# Patient Record
Sex: MALE | Race: WHITE
[De-identification: names, ages, dates, MRNs, and addresses within clinical notes are randomized per-mention and may not be internally consistent; named-entity substitution may affect disease eponyms.]

---

## 2017-03-28 ENCOUNTER — HOSPITAL ENCOUNTER (OUTPATIENT)
Age: 62
Discharge: HOME | End: 2017-03-28
Payer: COMMERCIAL

## 2017-03-28 DIAGNOSIS — Z95.5: ICD-10-CM

## 2017-03-28 DIAGNOSIS — K40.90: Primary | ICD-10-CM

## 2017-03-28 DIAGNOSIS — G47.33: ICD-10-CM

## 2017-03-28 DIAGNOSIS — M35.3: ICD-10-CM

## 2017-03-28 DIAGNOSIS — I25.10: ICD-10-CM

## 2017-03-28 PROCEDURE — 49650 LAP ING HERNIA REPAIR INIT: CPT

## 2017-03-28 PROCEDURE — 0YU54JZ SUPPLEMENT RIGHT INGUINAL REGION WITH SYNTHETIC SUBSTITUTE, PERCUTANEOUS ENDOSCOPIC APPROACH: ICD-10-PCS | Performed by: SURGERY

## 2020-02-28 ENCOUNTER — HOSPITAL ENCOUNTER (EMERGENCY)
Dept: HOSPITAL 76 - ED | Age: 65
Discharge: HOME | End: 2020-02-28
Payer: COMMERCIAL

## 2020-02-28 VITALS — DIASTOLIC BLOOD PRESSURE: 72 MMHG | SYSTOLIC BLOOD PRESSURE: 129 MMHG

## 2020-02-28 DIAGNOSIS — Z79.82: ICD-10-CM

## 2020-02-28 DIAGNOSIS — M35.3: ICD-10-CM
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
## 2020-02-28 DIAGNOSIS — Z87.891: ICD-10-CM
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
## 2020-02-28 DIAGNOSIS — S80.12XA: Primary | ICD-10-CM

## 2020-02-28 PROCEDURE — 1040M: CPT

## 2020-02-28 PROCEDURE — 73610 X-RAY EXAM OF ANKLE: CPT

## 2020-02-28 PROCEDURE — 73590 X-RAY EXAM OF LOWER LEG: CPT

## 2020-02-28 PROCEDURE — 99283 EMERGENCY DEPT VISIT LOW MDM: CPT

## 2020-02-28 NOTE — XRAY REPORT
Reason:  injury

Procedure Date:  02/28/2020   

Accession Number:  [id number] / [id number]                    

Procedure:  XR  - Tib/Fib LT CPT Code:  

 

***Final Report***

 

 

FULL RESULT:

 

 

EXAM:

LEFT TIBIA/FIBULA RADIOGRAPHY

 

EXAM DATE: 2/28/2020 12:00 PM.

 

CLINICAL HISTORY: Pain after injury.

 

COMPARISON: None.

 

TECHNIQUE: 2 views.

 

FINDINGS:

Bones: Normal. No fracture or bone lesion.

 

Joints: The visualized knee and ankle joints are normal. No effusions.

 

Soft Tissues: Normal. No soft tissue swelling.

IMPRESSION: Normal tibia/fibula radiography.

 

[facility]

## 2020-02-28 NOTE — ED PHYSICIAN DOCUMENTATION
PD HPI LOWER EXT INJURY





- Stated complaint


Stated Complaint: L LEG PX





- Chief complaint


Chief Complaint: Ext Problem





- History obtained from


History obtained from: Patient





- History of Present Illness


PD HPI LOW EXT INJURY LOCATION: Left (at work about a week ago a large box fell 

on the lateral side of his left leg and he has persistent pain there.  He can 

walk and bear weight.  Pain is actually worse at night and when he is inactive 

compared to when he is active.)





Review of Systems


Constitutional: reports: Reviewed and negative


Cardiac: reports: Reviewed and negative


Respiratory: reports: Reviewed and negative





PD PAST MEDICAL HISTORY





- Past Medical History


Cardiovascular: Coronary artery disease, Angina


Respiratory: Sleep apnea


Neuro: None


Endocrine/Autoimmune: None


GI: None


: None


HEENT: Chronic vision loss


Psych: None


Musculoskeletal: Rheumatoid arthritis, Chronic back pain


Derm: None


Other Past Medical History: polymyalgia rheumatica





- Past Surgical History


Past Surgical History: Yes


Ortho: Spine surgery


Cardiovascular: Coronary stent





- Present Medications


Home Medications: 


                                Ambulatory Orders











 Medication  Instructions  Recorded  Confirmed


 


Aspirin [Aspir-Low] 81 mg PO DAILY 03/22/17 03/28/17


 


Metoprolol Garcia/Hydrochlorothiaz 25 mg PO DAILY 03/22/17 03/28/17





[Metoprolol ER-Hctz 25-12.5 mg]   


 


Rosuvastatin Calcium [Crestor] 40 mg PO DAILY 03/22/17 03/28/17


 


predniSONE [Prednisone] 5 mg PO DAILY 03/22/17 03/28/17


 


Hydrocodone/Acetaminophen 1 - 2 each PO Q6H PRN #10 tablet 02/28/20 





[Hydrocodon-Acetaminophen 5-325]   














- Allergies


Allergies/Adverse Reactions: 


                                    Allergies











Allergy/AdvReac Type Severity Reaction Status Date / Time


 


morphine AdvReac  Nausea Verified 02/28/20 11:38














- Social History


Does the pt smoke?: No


Smoking Status: Former smoker


Does the pt drink ETOH?: Yes


Does the pt have substance abuse?: No





- Immunizations


Immunizations are current?: No


Immunizations: TDAP >10years/unknown





PD ED PE NORMAL





- Vitals


Vital signs reviewed: Yes





- General


General: Alert and oriented X 3, No acute distress





- Extremities


Extremities: Other (Some diffuse tenderness over the lateral side of the left 

leg without calf tenderness.  Mostly over the lateral ankle and the fibula above

that.  No foot tenderness.  No knee tenderness.  Normal neurovascular function 

in the foot.  No evidence of compartment syndrome.)





- Neuro


Neuro: Alert and oriented X 3, Normal speech





Results





- Vitals


Vitals: 


                               Vital Signs - 24 hr











  02/28/20





  11:35


 


Temperature 36.3 C L


 


Heart Rate 81


 


Respiratory 16





Rate 


 


Blood Pressure 129/72


 


O2 Saturation 99








                                     Oxygen











O2 Source                      Room air

















- Rads (name of study)


  ** XR L ankle and Tib Fib


Radiology: EMP read contemporaneously (normal)





Departure





- Departure


Disposition: 01 Home, Self Care


Clinical Impression: 


 Contusion of left leg





Condition: Good


Record reviewed to determine appropriate education?: Yes


Instructions:  ED Contusion Lower Ext


Prescriptions: 


Hydrocodone/Acetaminophen [Hydrocodon-Acetaminophen 5-325] 1 - 2 each PO Q6H PRN

#10 tablet


 PRN Reason: pain


Comments: 


Recheck with your doctor in 1 week if not better, return for new or worsening 

symptoms.


Discharge Date/Time: 02/28/20 13:32

## 2020-02-28 NOTE — XRAY REPORT
Reason:  injury

Procedure Date:  02/28/2020   

Accession Number:  [id number] / [id number]                    

Procedure:  XR  - Ankle 3 View LT CPT Code:  

 

***Final Report***

 

 

FULL RESULT:

 

 

EXAM:

LEFT ANKLE RADIOGRAPHY

 

EXAM DATE: 2/28/2020 12:00 PM.

 

CLINICAL HISTORY: Injury. 100 pound box fell on lower leg and ankle last 

week. Pain ever since.

 

COMPARISON: None.

 

TECHNIQUE: 3 views.

 

FINDINGS:

Bones: Small os trigonum is noted. No fractures or bone lesions.

 

Joints: Normal. No effusion. No subluxations. The ankle mortise is 

normally aligned.

 

Soft Tissues: Moderate soft tissue swelling over the lateral lower left 

leg.

IMPRESSION: No fracture or malalignment.

 

[facility]

## 2020-12-14 ENCOUNTER — HOSPITAL ENCOUNTER (EMERGENCY)
Dept: HOSPITAL 76 - ED | Age: 65
Discharge: HOME | End: 2020-12-14
Payer: MEDICARE

## 2020-12-14 VITALS — DIASTOLIC BLOOD PRESSURE: 60 MMHG | SYSTOLIC BLOOD PRESSURE: 102 MMHG

## 2020-12-14 DIAGNOSIS — R07.89: Primary | ICD-10-CM

## 2020-12-14 DIAGNOSIS — Z87.891: ICD-10-CM

## 2020-12-14 DIAGNOSIS — Z79.82: ICD-10-CM

## 2020-12-14 DIAGNOSIS — R10.13: ICD-10-CM

## 2020-12-14 DIAGNOSIS — R00.0: ICD-10-CM

## 2020-12-14 DIAGNOSIS — I25.10: ICD-10-CM

## 2020-12-14 DIAGNOSIS — Z95.5: ICD-10-CM

## 2020-12-14 DIAGNOSIS — I49.1: ICD-10-CM

## 2020-12-14 LAB
ALBUMIN DIAFP-MCNC: 4.7 G/DL (ref 3.2–5.5)
ALBUMIN/GLOB SERPL: 1.9 {RATIO} (ref 1–2.2)
ALP SERPL-CCNC: 61 IU/L (ref 42–121)
ALT SERPL W P-5'-P-CCNC: 39 IU/L (ref 10–60)
ANION GAP SERPL CALCULATED.4IONS-SCNC: 12 MMOL/L (ref 6–13)
AST SERPL W P-5'-P-CCNC: 26 IU/L (ref 10–42)
BASOPHILS NFR BLD AUTO: 0 10^3/UL (ref 0–0.1)
BASOPHILS NFR BLD AUTO: 0.3 %
BILIRUB BLD-MCNC: 1.3 MG/DL (ref 0.2–1)
BUN SERPL-MCNC: 18 MG/DL (ref 6–20)
CALCIUM UR-MCNC: 10.3 MG/DL (ref 8.5–10.3)
CHLORIDE SERPL-SCNC: 103 MMOL/L (ref 101–111)
CO2 SERPL-SCNC: 22 MMOL/L (ref 21–32)
CREAT SERPLBLD-SCNC: 1 MG/DL (ref 0.6–1.2)
EOSINOPHIL # BLD AUTO: 0.1 10^3/UL (ref 0–0.7)
EOSINOPHIL NFR BLD AUTO: 0.4 %
ERYTHROCYTE [DISTWIDTH] IN BLOOD BY AUTOMATED COUNT: 12.7 % (ref 12–15)
GLOBULIN SER-MCNC: 2.5 G/DL (ref 2.1–4.2)
GLUCOSE SERPL-MCNC: 127 MG/DL (ref 70–100)
HGB UR QL STRIP: 14.3 G/DL (ref 14–18)
LIPASE SERPL-CCNC: 37 U/L (ref 22–51)
LYMPHOCYTES # SPEC AUTO: 0.5 10^3/UL (ref 1.5–3.5)
LYMPHOCYTES NFR BLD AUTO: 4.7 %
MCH RBC QN AUTO: 33 PG (ref 27–31)
MCHC RBC AUTO-ENTMCNC: 34.1 G/DL (ref 32–36)
MCV RBC AUTO: 96.8 FL (ref 80–94)
MONOCYTES # BLD AUTO: 0.9 10^3/UL (ref 0–1)
MONOCYTES NFR BLD AUTO: 7.7 %
NEUTROPHILS # BLD AUTO: 10 10^3/UL (ref 1.5–6.6)
NEUTROPHILS # SNV AUTO: 11.5 X10^3/UL (ref 4.8–10.8)
NEUTROPHILS NFR BLD AUTO: 86.6 %
PDW BLD AUTO: 9.3 FL (ref 7.4–11.4)
PLATELET # BLD: 250 10^3/UL (ref 130–450)
PROT SPEC-MCNC: 7.2 G/DL (ref 6.7–8.2)
RBC MAR: 4.33 10^6/UL (ref 4.7–6.1)
SODIUM SERPLBLD-SCNC: 137 MMOL/L (ref 135–145)

## 2020-12-14 PROCEDURE — 84484 ASSAY OF TROPONIN QUANT: CPT

## 2020-12-14 PROCEDURE — 80053 COMPREHEN METABOLIC PANEL: CPT

## 2020-12-14 PROCEDURE — 96374 THER/PROPH/DIAG INJ IV PUSH: CPT

## 2020-12-14 PROCEDURE — 83690 ASSAY OF LIPASE: CPT

## 2020-12-14 PROCEDURE — 71275 CT ANGIOGRAPHY CHEST: CPT

## 2020-12-14 PROCEDURE — 99284 EMERGENCY DEPT VISIT MOD MDM: CPT

## 2020-12-14 PROCEDURE — 85025 COMPLETE CBC W/AUTO DIFF WBC: CPT

## 2020-12-14 PROCEDURE — 36415 COLL VENOUS BLD VENIPUNCTURE: CPT

## 2020-12-14 PROCEDURE — 71045 X-RAY EXAM CHEST 1 VIEW: CPT

## 2020-12-14 PROCEDURE — 93005 ELECTROCARDIOGRAM TRACING: CPT

## 2020-12-14 NOTE — CT REPORT
PROCEDURE:  ANGIO CHEST W/WO

 

INDICATIONS:  chest to back pain; evaluate aorta

 

CONTRAST:  IV CONTRAST: Optiray 320 ml: 80 PO CONTRAST: *NO PO CONTRAST 

 

TECHNIQUE:  

After the administration of intravenous contrast, 2 mm thick sections acquired from the pulmonary api
jaciel to the posterior costophrenic angles.  3-dimensional maximum intensity projection (MIP) coronal a
nd sagittal reformats were then acquired through the thorax. For radiation dose reduction, the follow
ing was used:  automated exposure control, adjustment of mA and/or kV according to patient size. 

 

COMPARISON:  Chest x-ray 12/14/2020

 

FINDINGS:  

Image quality:  Excellent.  

 

Pulmonary arteries:  Pulmonary arteries are normal in size, and demonstrate no intraluminal filling d
efects to suggest central pulmonary embolism.  

 

Lungs and pleura:  Lungs are clear.  No pleural effusions or pneumothorax.  Central and peripheral ai
rways are patent.  

 

Mediastinum:  Heart size is normal, without pericardial effusion.  No mediastinal or hilar adenopathy
.  Thoracic aorta is normal in caliber and enhancement.  There is no aneurysmal dilation, vascular oc
clusion, hemodynamically significant stenosis or dissection. Esophagus is normal in caliber, with mil
d hiatal hernia.  

 

Bones and chest wall:  No suspicious bony lesions.  Ribs and thoracic spine appear intact throughout.
  Is unremarkable  No axillary or supraclavicular adenopathy.  

 

Abdomen:  Visualized upper abdominal solid organs appear normal in the early arterial phase of enhanc
ement.  

 

IMPRESSION:  

 

 

1. No pulmonary embolism.

 

2. Aorta demonstrates no areas of hemodynamically significant stenosis, vascular occlusion, aneurysma
l dilation or dissection.

 

Reviewed by: Irma Segura MD on 12/14/2020 8:32 AM PST

Approved by: Irma Segura MD on 12/14/2020 8:32 AM PST

 

 

Station ID:  [id number]

## 2020-12-14 NOTE — ED PHYSICIAN DOCUMENTATION
ED Addendum





- Addendum


Addendum: 





12/14/20 07:35


Signout taken from Dr. Fernandez at shift change.  Briefly this is a 65-year-old 

gentleman with history of coronary disease, stented once in 2014 who developed 

upper abdominal pain rating to the back and shoulder blades last night after 

eating hot and sour soup.  At home he took some hydrocodone, and acids.  On my 

evaluation he is feeling better after a GI cocktail and some Toradol.  Initial 

troponin negative, EKG nonischemic.  Dr. Fernandez sent him for CT angiography of 

the chest to rule out dissection, waiting on radiology read but to my eye 

grossly negative.  Chest x-ray concerning for some bronchial wall thickening in 

the right lower lobe, I do not really appreciate this on the CAT scan, will 

await CT read before addressing this.  Will check a second troponin.  He is 

nontender in the right upper quadrant.


12/14/20 08:35


Repeat troponin completely flat, 10, 10.  CT angiography negative per the 

radiologist.


12/14/20 08:36


Disposition discharged home, condition stable


Diagnosis


1.  Unspecified chest pain

## 2020-12-14 NOTE — XRAY REPORT
PROCEDURE:  Chest 1 View X-Ray

 

INDICATIONS:  Chest Pain

 

TECHNIQUE:  One view of the chest was acquired.  

 

COMPARISON:  11/3/2014

 

FINDINGS:  

 

Surgical changes and devices:  None.  

 

Lungs and pleura:  No pleural effusions or pneumothorax.  Lungs are clear.  

 

Mediastinum:  Mediastinal contours appear normal.  Heart size is normal.  

 

Bones and chest wall:  No suspicious bony lesions.  Overlying soft tissues appear unremarkable.  

 

 

IMPRESSION:  

 

No acute cardiopulmonary disease process.

 

Reviewed by: Sophie Jose MD, PhD on 12/14/2020 9:31 AM [facility]

Approved by: Sophie Jose MD, PhD on 12/14/2020 9:31 AM [facility]

 

 

Station ID:  [id number]

## 2020-12-14 NOTE — ED PHYSICIAN DOCUMENTATION
PD HPI CHEST PAIN





- Stated complaint


Stated Complaint: CHEST PX





- History obtained from


History obtained from: Patient





- History of Present Illness


Timing - onset: How many hours ago (12), Yesterday (about 5 pm, soon after 

eating meal.)


Timing - onset during: Eating (soon after done eating dinner; was slightly 

spicy.)


Timing - duration: Hours (12)


Timing - details: Abrupt onset, Still present (epigastric pain worsens with 

lying down, better sitting up but then upper back hurts more. Has not eaten 

since onset. No change with antacids though.), Waxing and waning


Quality: Aching, Sharp, Pain


Location: Substernal


Radiation: Back, Left upper extremity (shoulders), Right upper extremity


Improved by: No: Antacids


Worsened by: Position (better sitting up).  No: Inspiration, Eating


Associated symptoms: Nausea.  No: Shortness of air, Diaphoresis, Feeling faint /

dizzy, General Weakness, Cough


Similar symptoms before: Has not had sx before (has had unstable angina 2014 

with stent placed. No MI. Did not feel like this, though.)


Recently seen: Not recently seen





Review of Systems


Constitutional: denies: Fever, Chills


Nose: denies: Rhinorrhea / runny nose, Congestion


Throat: denies: Sore throat


Cardiac: reports: Chest pain / pressure (just since last evening; no recent 

exertional CP nor dyspnea.).  denies: Palpitations, Pedal edema, Calf pain


Respiratory: denies: Cough


GI: denies: Abdominal Pain, Nausea, Vomiting, Diarrhea


Skin: denies: Rash, Lesions


Neurologic: denies: Generalized weakness, Near syncope





PD PAST MEDICAL HISTORY





- Past Medical History


Cardiovascular: Coronary artery disease, Angina


Respiratory: Sleep apnea


Neuro: None


Endocrine/Autoimmune: None


GI: None


: None


HEENT: Chronic vision loss


Psych: None


Musculoskeletal: Rheumatoid arthritis, Chronic back pain


Derm: None





- Past Surgical History


Past Surgical History: Yes


Ortho: Spine surgery


Cardiovascular: Coronary stent





- Present Medications


Home Medications: 


                                Ambulatory Orders











 Medication  Instructions  Recorded  Confirmed


 


Aspirin [Aspir-Low] 325 mg PO DAILY 03/22/17 03/28/17


 


Metoprolol Garcia/Hydrochlorothiaz 25 mg PO DAILY 03/22/17 03/28/17





[Metoprolol ER-Hctz 25-12.5 mg]   


 


Rosuvastatin Calcium [Crestor] 40 mg PO DAILY 03/22/17 03/28/17


 


predniSONE [Prednisone] 2.5 mg PO DAILY 03/22/17 03/28/17


 


Methotrexate  12/14/20 














- Allergies


Allergies/Adverse Reactions: 


                                    Allergies











Allergy/AdvReac Type Severity Reaction Status Date / Time


 


morphine AdvReac  Nausea Verified 12/14/20 05:24














- Social History


Does the pt smoke?: No


Smoking Status: Former smoker


Does the pt drink ETOH?: Yes


Does the pt have substance abuse?: No





- Immunizations


Immunizations are current?: No


Immunizations: TDAP >10years/unknown





PD ED PE NORMAL





- Vitals


Vital signs reviewed: Yes





- General


General: Alert and oriented X 3, No acute distress, Well developed/nourished





- HEENT


HEENT: Pharynx benign





- Neck


Neck: Supple, no meningeal sign, No adenopathy





- Cardiac


Cardiac: RRR, No murmur





- Respiratory


Respiratory: Clear bilaterally





- Abdomen


Abdomen: Normal bowel sounds, Soft, Non tender, Non distended





- Back


Back: No CVA TTP





- Derm


Derm: Normal color, Warm and dry





- Extremities


Extremities: No tenderness to palpate, Normal ROM s pain, No edema, No calf 

tenderness / cord





- Neuro


Neuro: Alert and oriented X 3, No motor deficit, Normal speech





Results





- Vitals


Vitals: 


                               Vital Signs - 24 hr











  12/14/20 12/14/20 12/14/20





  05:20 05:53 06:20


 


Temperature 36.8 C 36.8 C 


 


Heart Rate 111 H 94 99


 


Respiratory 20 16 16





Rate   


 


Blood Pressure 151/84 H 127/81 H 123/68


 


O2 Saturation 98 97 99














  12/14/20 12/14/20 12/14/20





  06:27 06:30 07:00


 


Temperature  36.8 C 


 


Heart Rate 108 H 94 99


 


Respiratory  16 14





Rate   


 


Blood Pressure 114/66 105/62 102/62


 


O2 Saturation  100 98








                                     Oxygen











O2 Source                      Room air

















- EKG (time done)


  ** 05:19


Rate: Rate (enter#) (102)


Rhythm: Sinus tachycardia


Axis: Normal


Intervals: Normal NC


QRS: Poor R wave progression


Ischemia: Normal ST segments.  No: ST elevation c/w ischemia, ST depression





- Labs


Labs: 


                                Laboratory Tests











  12/14/20 12/14/20 12/14/20





  05:31 05:31 05:31


 


WBC  11.5 H  


 


RBC  4.33 L  


 


Hgb  14.3  


 


Hct  41.9 L  


 


MCV  96.8 H  


 


MCH  33.0 H  


 


MCHC  34.1  


 


RDW  12.7  


 


Plt Count  250  


 


MPV  9.3  


 


Neut # (Auto)  10.0 H  


 


Lymph # (Auto)  0.5 L  


 


Mono # (Auto)  0.9  


 


Eos # (Auto)  0.1  


 


Baso # (Auto)  0.0  


 


Absolute Nucleated RBC  0.00  


 


Nucleated RBC %  0.0  


 


Sodium   137 


 


Potassium   3.8 


 


Chloride   103 


 


Carbon Dioxide   22 


 


Anion Gap   12.0 


 


BUN   18 


 


Creatinine   1.0 


 


Estimated GFR (MDRD)   75 L 


 


Glucose   127 H 


 


Calcium   10.3 


 


Total Bilirubin   1.3 H 


 


AST   26 


 


ALT   39 


 


Alkaline Phosphatase   61 


 


Troponin I High Sens    10.1


 


Total Protein   7.2 


 


Albumin   4.7 


 


Globulin   2.5 


 


Albumin/Globulin Ratio   1.9 


 


Lipase   37 














- Rads (name of study)


  ** chest xray


Radiology: Prelim report reviewed (possible small opacity right lower lobe; 

consideration bronchiolitis, pneumonia), See rad report





PD MEDICAL DECISION MAKING





- ED course


Complexity details: reviewed results, re-evaluated patient (no change with GI 

cocktail. No change with NTG. CXR clear. Labs without acute process. At this 

point, also consider vascular, such as dissection. Will get angio. ), considered

 differential (Your myocardial ischemia, lung process, gastritis or gallbladder,

 pancreatitis.  Will check EKG chest x-ray and labs.), d/w patient


ED course: 





The patient did not have any improvement with a GI cocktail nor nitroglycerin.  

Toradol is seeming to have some effect starting.  He had had a negative troponin

 at 12 hours after onset of symptoms which seems reasonably conclusive but we 

can still repeated at 2 hours to be quite sure.  Otherwise his description of 

pain is potentially concerning for vascular and given no other good explanation 

at this point, I talked with the patient and we shared decision making for angio

 of the chest to evaluate the aorta.  At this point that test is pending.  Care 

is given over to Dr. Horne who is the oncoming ER physician and will await the

 angio results and follow-up with the patient's clinical state and troponin 

repeat. 





Departure





- Departure


Clinical Impression: 


 Chest pain





Condition: Stable


Record reviewed to determine appropriate education?: Yes

## 2021-11-04 ENCOUNTER — HOSPITAL ENCOUNTER (OUTPATIENT)
Dept: HOSPITAL 76 - DI | Age: 66
Discharge: HOME | End: 2021-11-04
Attending: INTERNAL MEDICINE
Payer: OTHER GOVERNMENT

## 2021-11-04 DIAGNOSIS — Z13.820: Primary | ICD-10-CM

## 2021-11-04 DIAGNOSIS — M85.88: ICD-10-CM

## 2021-11-05 NOTE — DEXA REPORT
PROCEDURE: Dexa Spine and/or Hip

 

INDICATIONS: HX OF PREDNISONE USE, SCREENING FOR OSTEOPOROSIS

 

TECHNIQUE: Dual energy x-ray absorptiometry (DXA) was performed on a [employer] System.  Regions measur
ed are the AP Spine, femoral neck, and if needed forearm.

 

COMPARISON: None.

  

FINDINGS:

 

Lumbar Spine: 

Bone Mineral Density   1.278 g/cm/cm,T score  0.5, normal

 

Left Hip:

Bone Mineral Density  1.05 to g/cm/cm,T score -0.3, normal

 

Left Femoral Neck:

Bone Mineral Density  0.922 g/cm/cm, T score -1.1, minimal osteopenia

 

 

(T score greater or equal to -1.0: NORMAL)

(T score from -1.1 to -2.4: OSTEOPENIA)

(T score less than or equal to -2.5 to: OSTEOPOROSIS)

 

Impression: Minimal osteopenia in the left femoral neck.

 

Patients with diagnosis of osteoporosis or osteopenia should have regular bone mineral density assess
ment.  For those eligible for Medicare, routine testing is allowed once every 2 years.  Testing frequ
ency can be increased for patients who have rapidly progressing disease or for those who are receivin
g medical therapy to restore bone mass.

 

Reviewed by: Irma Segura MD on 11/5/2021 4:17 PM PDT

Approved by: Irma Segura MD on 11/5/2021 4:17 PM PDT

 

 

Station ID:  [id number]

## 2023-03-27 ENCOUNTER — HOSPITAL ENCOUNTER (OUTPATIENT)
Dept: HOSPITAL 76 - DI | Age: 68
Discharge: HOME | End: 2023-03-27
Attending: INTERNAL MEDICINE
Payer: MEDICARE

## 2023-03-27 DIAGNOSIS — M19.012: Primary | ICD-10-CM

## 2023-03-27 NOTE — XRAY REPORT
PROCEDURE:  Shoulder 3 View LT

 

INDICATIONS:  PAIN IN LEFT SHOULDER

 

TECHNIQUE:  3 views of the shoulder were acquired.  

 

COMPARISON:  None.

 

FINDINGS:  

 

Bones:  No fractures or dislocations.  No suspicious bony lesions.  Visualized ribs appear intact.  

 

Soft tissues:  No suspicious soft tissue calcifications.

 

There is some mild-to-moderate AC joint degenerative change present with no significant glenohumeral 
joint degenerative change is seen. 

 

IMPRESSION:  

1. No evidence for acute osseous abnormality involving the left shoulder.

2. Mild-to-moderate AC joint degenerative change.

 

Reviewed by: Gerry Foley MD on 3/27/2023 11:32 AM PDT

Approved by: Gerry Foley MD on 3/27/2023 11:32 AM PDT

 

 

Station ID:  [id number]